# Patient Record
Sex: FEMALE | Race: WHITE | Employment: FULL TIME | URBAN - METROPOLITAN AREA
[De-identification: names, ages, dates, MRNs, and addresses within clinical notes are randomized per-mention and may not be internally consistent; named-entity substitution may affect disease eponyms.]

---

## 2017-02-28 ENCOUNTER — OFFICE VISIT (OUTPATIENT)
Dept: SURGERY | Facility: CLINIC | Age: 52
End: 2017-02-28

## 2017-02-28 VITALS — HEART RATE: 76 BPM | RESPIRATION RATE: 18 BRPM | DIASTOLIC BLOOD PRESSURE: 86 MMHG | SYSTOLIC BLOOD PRESSURE: 126 MMHG

## 2017-02-28 DIAGNOSIS — I67.1 ANEURYSM OF CAVERNOUS PORTION OF LEFT INTERNAL CAROTID ARTERY: Primary | ICD-10-CM

## 2017-02-28 PROCEDURE — 99214 OFFICE O/P EST MOD 30 MIN: CPT | Performed by: RADIOLOGY

## 2017-02-28 RX ORDER — METOPROLOL SUCCINATE 50 MG/1
1 TABLET, EXTENDED RELEASE ORAL DAILY
Refills: 6 | COMMUNITY
Start: 2017-01-09 | End: 2020-02-11

## 2017-02-28 RX ORDER — LOSARTAN POTASSIUM 100 MG/1
1 TABLET ORAL DAILY
Refills: 3 | COMMUNITY
Start: 2017-02-07 | End: 2020-02-11

## 2017-02-28 RX ORDER — ATORVASTATIN CALCIUM 10 MG/1
1 TABLET, FILM COATED ORAL DAILY
Refills: 6 | COMMUNITY
Start: 2017-02-17

## 2017-02-28 NOTE — PATIENT INSTRUCTIONS
Refill policies:    • Allow 2 business days for refills; controlled substances may take longer.   • Contact your pharmacy at least 5 days prior to running out of medication and have them send an electronic request or submit request through the “request re your physician has recommended that you have a procedure or additional testing performed. [facility]) will contact your insurance carrier to obtain pre-certification or prior authorization.     Unfortunately, MARTINE has seen an increas

## 2017-02-28 NOTE — PROGRESS NOTES
Has not completed f/u imaging she was unaware any imaging needed to be completed.     Review of Systems:    Hand Dominance: right  General: no symptoms reported  Neuro: no symptoms reported  Head: ear pain 2 weeks ago (happens once every few months and star

## 2017-02-28 NOTE — PROGRESS NOTES
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   As you well remember, Metoprolol Succinate ER 50 MG Oral Tablet 24 Hr Take 1 tablet by mouth daily. Disp:  Rfl: 6   aspirin 81 MG Oral Tab Take 81 mg by mouth daily. Disp:  Rfl:       Review of patient's allergies indicates no known allergies.      Family History   Problem Rel

## 2017-03-21 ENCOUNTER — HOSPITAL ENCOUNTER (OUTPATIENT)
Dept: MRI IMAGING | Facility: HOSPITAL | Age: 52
Discharge: HOME | End: 2017-03-21
Attending: RADIOLOGY
Payer: MEDICAID

## 2017-03-21 DIAGNOSIS — I67.1 ANEURYSM OF CAVERNOUS PORTION OF LEFT INTERNAL CAROTID ARTERY: ICD-10-CM

## 2017-03-21 PROCEDURE — 70546 MR ANGIOGRAPH HEAD W/O&W/DYE: CPT

## 2017-03-21 PROCEDURE — A9575 INJ GADOTERATE MEGLUMI 0.1ML: HCPCS | Performed by: RADIOLOGY

## 2017-04-21 ENCOUNTER — HOSPITAL ENCOUNTER (OUTPATIENT)
Dept: MAMMOGRAPHY | Facility: HOSPITAL | Age: 52
Discharge: HOME | End: 2017-04-21
Attending: INTERNAL MEDICINE
Payer: MEDICAID

## 2017-04-21 DIAGNOSIS — Z12.31 ENCOUNTER FOR SCREENING MAMMOGRAM FOR MALIGNANT NEOPLASM OF BREAST: ICD-10-CM

## 2017-04-21 PROCEDURE — 77067 SCR MAMMO BI INCL CAD: CPT

## 2017-05-04 ENCOUNTER — HOSPITAL ENCOUNTER (OUTPATIENT)
Dept: MAMMOGRAPHY | Facility: HOSPITAL | Age: 52
Discharge: HOME | End: 2017-05-04
Attending: INTERNAL MEDICINE
Payer: MEDICAID

## 2017-05-04 DIAGNOSIS — R92.2 INCONCLUSIVE MAMMOGRAM: ICD-10-CM

## 2017-05-04 PROCEDURE — 77065 DX MAMMO INCL CAD UNI: CPT | Performed by: INTERNAL MEDICINE

## 2017-05-04 PROCEDURE — 77061 BREAST TOMOSYNTHESIS UNI: CPT | Performed by: INTERNAL MEDICINE

## 2017-05-04 PROCEDURE — 76642 ULTRASOUND BREAST LIMITED: CPT | Performed by: INTERNAL MEDICINE

## 2018-04-24 ENCOUNTER — HOSPITAL ENCOUNTER (OUTPATIENT)
Dept: MAMMOGRAPHY | Facility: HOSPITAL | Age: 53
Discharge: HOME | End: 2018-04-24
Attending: INTERNAL MEDICINE
Payer: MEDICAID

## 2018-04-24 DIAGNOSIS — Z12.31 ENCOUNTER FOR SCREENING MAMMOGRAM FOR MALIGNANT NEOPLASM OF BREAST: ICD-10-CM

## 2018-04-24 PROCEDURE — 77067 SCR MAMMO BI INCL CAD: CPT | Performed by: INTERNAL MEDICINE

## 2020-03-14 PROBLEM — R31.0 GROSS HEMATURIA: Status: ACTIVE | Noted: 2020-03-14

## 2020-03-17 ENCOUNTER — HOSPITAL ENCOUNTER (EMERGENCY)
Facility: HOSPITAL | Age: 55
Discharge: HOME | End: 2020-03-17
Attending: EMERGENCY MEDICINE
Payer: COMMERCIAL

## 2020-03-17 VITALS
HEIGHT: 63 IN | HEART RATE: 69 BPM | WEIGHT: 145 LBS | SYSTOLIC BLOOD PRESSURE: 178 MMHG | TEMPERATURE: 98 F | BODY MASS INDEX: 25.69 KG/M2 | DIASTOLIC BLOOD PRESSURE: 96 MMHG | RESPIRATION RATE: 18 BRPM | OXYGEN SATURATION: 99 %

## 2020-03-17 DIAGNOSIS — R31.0 GROSS HEMATURIA: Primary | ICD-10-CM

## 2020-03-17 PROCEDURE — 99282 EMERGENCY DEPT VISIT SF MDM: CPT

## 2020-03-17 NOTE — ED INITIAL ASSESSMENT (HPI)
Saw urology 2 days ago for blood in urine and put on macrobid. She has had 4 doses of the macrobid and is not better yet.

## 2020-03-18 NOTE — ED PROVIDER NOTES
Patient Seen in: [facility] Emergency Department      History   Patient presents with:  Urinary Symptoms    Stated Complaint: blood w/ urination     HPI    49-year-old presents to the emergency department with blood in her urine no pain no burning b Head: Atraumatic. Right Ear: External ear normal.      Left Ear: External ear normal.      Nose: Nose normal.   Eyes:      General: No scleral icterus. Right eye: No discharge. Left eye: No discharge.       Conjunctiva/sclera: Conjunctiv they will be in touch.   Patient is agreeable to the plan she stable for discharge home    Disposition and Plan     Clinical Impression:  Gross hematuria  (primary encounter diagnosis)    Disposition:  Discharge  3/17/2020  7:51 pm    Follow-up:  Shea Fuchs

## 2020-03-18 NOTE — CM/SW NOTE
Pt has CT urogram scheduled from [facility] family medicine doctor. She has tried to schedule test to get it done here but they inform her that she is out of network.  I will call central scheduling tomorrow to see if I'm able to schedule her test. I did tell ozzy

## 2020-03-19 ENCOUNTER — TELEPHONE (OUTPATIENT)
Dept: SURGERY | Facility: CLINIC | Age: 55
End: 2020-03-19

## 2020-03-19 NOTE — TELEPHONE ENCOUNTER
Spoke to patients daughter and explained Dr. Ingrid Craig will call patient personally after she has obtained her imaging.  Daughter states patient is scheduled for imaging tomorrow as long as labs are WNL, and there are no issues at the imaging facility itself

## 2020-03-20 ENCOUNTER — TELEPHONE (OUTPATIENT)
Dept: SURGERY | Facility: CLINIC | Age: 55
End: 2020-03-20

## 2020-03-20 NOTE — TELEPHONE ENCOUNTER
Explained patient has had imaging prior, this time the ordering physican is Dr. Donna Metzger.   Attempt made to provide Dr. Heaven Adair information, facility hung up

## 2020-03-20 NOTE — TELEPHONE ENCOUNTER
patient needs to know if she has anything up in her head aparatus of some sort before putting her in machine for MRI

## 2021-04-22 ENCOUNTER — TELEPHONE (OUTPATIENT)
Dept: SURGERY | Facility: CLINIC | Age: 56
End: 2021-04-22

## 2021-04-22 NOTE — TELEPHONE ENCOUNTER
Please advise Dr. Hanna Argueta is out of office this week. We will need to touch base with him in regards to this patient next week.

## 2021-04-22 NOTE — TELEPHONE ENCOUNTER
Patient came in stating that she needs an MRI order to be put in for her. I see she has not been here in a while so I stated that we will get back to her on this order. She wanted to know if it is with contrast or without.  She cancelled the MRI last time in 2020

## 2021-04-28 ENCOUNTER — TELEPHONE (OUTPATIENT)
Dept: SURGERY | Facility: CLINIC | Age: 56
End: 2021-04-28

## 2021-04-28 DIAGNOSIS — I67.1 ANEURYSM OF CAVERNOUS PORTION OF LEFT INTERNAL CAROTID ARTERY: Primary | ICD-10-CM

## 2021-04-28 NOTE — TELEPHONE ENCOUNTER
Placing order for MRA Head w/ + w/out for pt. She had treatment of cavernous L ICA aneurysm in 2013 with pipeline by Dr. Dave Swan. She is overdue for follow-up imaging. She can follow up in clinic after she obtains her imaging.     Maki Lopez, 04/28/2

## 2021-04-30 NOTE — TELEPHONE ENCOUNTER
Received notification that imaging and follow up are due and that orders have been placed. [employer] message sent to patient to inform her of imaging orders. Reminder placed to check to see if imaging has been scheduled.

## 2021-04-30 NOTE — TELEPHONE ENCOUNTER
Placed reminder to check to see if MRA is scheduled. Additionally, in separate TE, patient has been sent [employer] message notifying her that imaging has been ordered and that she needs f/u appt.

## 2021-05-26 NOTE — TELEPHONE ENCOUNTER
Checked with Chely Brantley - case # [id number] -  case was still pending. Spoke with Leslie Ayala, and was able to get it approved, however, this was approved for [facility] as this facility is in network with her insurance.  I will update ref

## 2021-05-26 NOTE — TELEPHONE ENCOUNTER
Priority: Routine      Class: EHV - RFL      Associated DX: Aneurysm of cavernous portion of left internal carotid artery (I67.1)      Indications:                         [facility] () [10]                Indeterminate [13] No C

## 2021-05-26 NOTE — TELEPHONE ENCOUNTER
Reminder received in nursing tab that imaging is past due. Routed to prior auth team to address authorization.

## 2021-07-06 ENCOUNTER — TELEPHONE (OUTPATIENT)
Dept: NEUROLOGY | Facility: CLINIC | Age: 56
End: 2021-07-06

## 2021-08-17 ENCOUNTER — OFFICE VISIT (OUTPATIENT)
Dept: SURGERY | Facility: CLINIC | Age: 56
End: 2021-08-17
Payer: COMMERCIAL

## 2021-08-17 VITALS
BODY MASS INDEX: 24.16 KG/M2 | HEIGHT: 65 IN | WEIGHT: 145 LBS | DIASTOLIC BLOOD PRESSURE: 90 MMHG | SYSTOLIC BLOOD PRESSURE: 144 MMHG

## 2021-08-17 DIAGNOSIS — I67.1 ANEURYSM OF CAVERNOUS PORTION OF LEFT INTERNAL CAROTID ARTERY: Primary | ICD-10-CM

## 2021-08-17 PROCEDURE — 99203 OFFICE O/P NEW LOW 30 MIN: CPT | Performed by: RADIOLOGY

## 2021-08-17 PROCEDURE — 3077F SYST BP >= 140 MM HG: CPT | Performed by: RADIOLOGY

## 2021-08-17 PROCEDURE — 3080F DIAST BP >= 90 MM HG: CPT | Performed by: RADIOLOGY

## 2021-08-17 PROCEDURE — 3008F BODY MASS INDEX DOCD: CPT | Performed by: RADIOLOGY

## 2021-08-17 RX ORDER — OLMESARTAN MEDOXOMIL 40 MG/1
TABLET ORAL
COMMUNITY
Start: 2021-07-27

## 2021-08-17 RX ORDER — VENLAFAXINE HYDROCHLORIDE 37.5 MG/1
37.5 CAPSULE, EXTENDED RELEASE ORAL DAILY
COMMUNITY

## 2021-08-17 NOTE — PROGRESS NOTES
[facility]  Interventional Neuroradiology Clinic Note        Jane Vaca  Primary Care      Date of Service: 8/17/2021    Dear Connie Dyer,     We had the pleasure of seeing Rudolph Colorado in the Interventional Neuroradiology Clinic at [facility] • Breast Cancer Mother 70         Medications:    Current Outpatient Medications:   •  ergocalciferol 1.25 MG (11213 UT) Oral Cap, Take 1 capsule (50,000 Units total) by mouth every 7 days. , Disp: 12 capsule, Rfl: 0  •  Metoprolol Succinate ER 50 MG Oral the right internal carotid artery. 3/21/2017 MRA head W/WO  CONCLUSION:     1. Artifact from pipeline stent is noted spanning the cavernous and periophthalmic segments of the left internal carotid artery.  There is no contrast-related or flow related enh

## 2021-08-17 NOTE — PROGRESS NOTES
Patient is here for follow up. She was last seen in 2017 for aneurysm and is here to review her recent imaging (MRA brain and MRA head on 7-6-21). In 2013 she had a flow diverting stent placed.       Review of Systems:    Hand Dominance: right  General: n

## 2021-09-03 ENCOUNTER — TELEPHONE (OUTPATIENT)
Dept: SURGERY | Facility: CLINIC | Age: 56
End: 2021-09-03

## 2021-09-03 NOTE — TELEPHONE ENCOUNTER
Per OV note on 9-2-21 Noncontrast MRA head in 3 years (July 2024), FU in clinic to discuss     Reminder placed

## 2023-05-16 NOTE — OPERATIVE REPORT
PATIENT NAME: Benjie Garvin  MRN: [id number]  DATE OF OPERATION:  5/18/23  REFERRING PHYSICIAN: Dr. Mike Awad  Medications:  MAC  Date of last COLONOSCOPY:  2013  PREOPERATIVE DIAGNOSIS                colon cancer screening   POSTOPERATIVE DIAGNOSIS:  1. 1 cm sigmoid colon polyps x 2 removed with a hot snare, from 35 cm in the sigmoid. 2. 6 mm sigmoid colon polyps x 2 were removed via cold snare. 3. Diverticulosis were scattered throughout the colon. 4. Enlarged internal hemorrhoids. PROCEDURE PERFORMED:               Colonoscopy with polypectomy via hot snare and cold snare. Endoscopist:                                             Ramona Roger MD     PROCEDURE AND FINDINGS: The patient was placed into the left lateral decubitus after informed consent was obtained. All questions were answered. An updated history and physical were performed and an ASA score of 2 was assigned. Informed consent was obtained prior to the procedure, with review of possible complications including bleeding, infection, and missed polyps and or cancer. MAC sedation was administered. A digital rectal exam was performed and was normal.  The video pediatric colonoscope was passed from anus to cecum. The ileocecal valve, appendiceal orfice, hepatic and splenic flexures were all well visualized. The bowel preparation was rated on the Aronchick bowel prep scale as 1. (1 - excellent > 95% mucosa seen; 2 - good - clear liquid up to 25% of the mucosa, 90% mucosa seen;  3 - fair - semisolid stool not suctioned, but 90% of the mucosa seen; 4 - poor - semisolid stool not suctioned, but < 90% mucosa seen; 5 - inadequate - repeat prep needed) . Findings included 1 cm sigmoid colon polyps x 2 removed with a hot snare, from 35 cm in the sigmoid. 6 mm sigmoid colon polyps x 2 were removed via cold snare. Diverticulosis were scattered throughout the colon. On retroflexion of the scope in the anorectum, normal internal hemorrhoids were noted. The scope withdrawal from cecum to anus was 15 minutes. RECOMMENDATIONS         1. The patient will be provided with a written summary of today's endoscopic findings. 2. The patient will be notified with biopsy results in 2 - 4 working days. 3. Do not take aspirin or any other aspirin-like medication for 10 days after the procedure. 4. Recommendations regarding repeat colonoscopy will be made once the biopsy results are reviewed.       Enedina Lock MD, Gastroenterologist  Cc: Dr. Rita Leiva

## 2023-05-18 ENCOUNTER — ANESTHESIA (OUTPATIENT)
Dept: ENDOSCOPY | Facility: HOSPITAL | Age: 58
End: 2023-05-18
Payer: COMMERCIAL

## 2023-05-18 ENCOUNTER — ANESTHESIA EVENT (OUTPATIENT)
Dept: ENDOSCOPY | Facility: HOSPITAL | Age: 58
End: 2023-05-18
Payer: COMMERCIAL

## 2023-05-18 ENCOUNTER — HOSPITAL ENCOUNTER (OUTPATIENT)
Facility: HOSPITAL | Age: 58
Setting detail: HOSPITAL OUTPATIENT SURGERY
Discharge: HOME | End: 2023-05-18
Attending: INTERNAL MEDICINE | Admitting: INTERNAL MEDICINE
Payer: COMMERCIAL

## 2023-05-18 VITALS
HEART RATE: 60 BPM | BODY MASS INDEX: 27.46 KG/M2 | TEMPERATURE: 99 F | DIASTOLIC BLOOD PRESSURE: 98 MMHG | HEIGHT: 63 IN | RESPIRATION RATE: 18 BRPM | SYSTOLIC BLOOD PRESSURE: 151 MMHG | OXYGEN SATURATION: 97 % | WEIGHT: 155 LBS

## 2023-05-18 PROCEDURE — 88305 TISSUE EXAM BY PATHOLOGIST: CPT | Performed by: INTERNAL MEDICINE

## 2023-05-18 PROCEDURE — 0DBN8ZX EXCISION OF SIGMOID COLON, VIA NATURAL OR ARTIFICIAL OPENING ENDOSCOPIC, DIAGNOSTIC: ICD-10-PCS | Performed by: INTERNAL MEDICINE

## 2023-05-18 RX ORDER — NALOXONE HYDROCHLORIDE 0.4 MG/ML
80 INJECTION, SOLUTION INTRAMUSCULAR; INTRAVENOUS; SUBCUTANEOUS AS NEEDED
Status: DISCONTINUED | OUTPATIENT
Start: 2023-05-18 | End: 2023-05-18

## 2023-05-18 RX ORDER — SODIUM CHLORIDE, SODIUM LACTATE, POTASSIUM CHLORIDE, CALCIUM CHLORIDE 600; 310; 30; 20 MG/100ML; MG/100ML; MG/100ML; MG/100ML
INJECTION, SOLUTION INTRAVENOUS CONTINUOUS
Status: DISCONTINUED | OUTPATIENT
Start: 2023-05-18 | End: 2023-05-18

## 2023-05-18 RX ORDER — LIDOCAINE HYDROCHLORIDE 10 MG/ML
INJECTION, SOLUTION EPIDURAL; INFILTRATION; INTRACAUDAL; PERINEURAL AS NEEDED
Status: DISCONTINUED | OUTPATIENT
Start: 2023-05-18 | End: 2023-05-18 | Stop reason: SURG

## 2023-05-18 RX ORDER — ONDANSETRON 2 MG/ML
4 INJECTION INTRAMUSCULAR; INTRAVENOUS AS NEEDED
Status: DISCONTINUED | OUTPATIENT
Start: 2023-05-18 | End: 2023-05-18

## 2023-05-18 RX ADMIN — LIDOCAINE HYDROCHLORIDE 25 MG: 10 INJECTION, SOLUTION EPIDURAL; INFILTRATION; INTRACAUDAL; PERINEURAL at 09:40:00

## 2023-05-18 RX ADMIN — SODIUM CHLORIDE, SODIUM LACTATE, POTASSIUM CHLORIDE, CALCIUM CHLORIDE: 600; 310; 30; 20 INJECTION, SOLUTION INTRAVENOUS at 09:40:00

## 2023-05-18 NOTE — DISCHARGE INSTRUCTIONS
ENDOSCOPY DISCHARGE INSTRUCTIONS    Procedure Performed:   Colonoscopy  Endoscopist: No name on file  FINDINGS:   Internal/external hemorrhoids, Diverticulosis (pockets in colon that develop with age and lack of fiber intake), and Colon polyp(s) (a growth in the colon)    MEDICATIONS:  You may resume all other medications today    DIET:  General    BIOPSIES:  Biopsies were taken (you will be notified of results in 7-10 days)      ADDITIONAL RECOMMENDATIONS:  Do not take aspirin or any other aspirin-like medication for 10 days after the procedure. Recommendations regarding repeat colonoscopy will be made once the biopsy results are reviewed. Activity for remainder of today:    REST TODAY  DO NOT drive or operate heavy machinery  DO NOT drink any alcoholic beverages  DO NOT sign any legal documents or make any important decisions    After your procedure(s): It is not unusual to feel bloated or gassy . Passing gas and belching is encouraged. Lying on your left side with your knees flexed may relieve the discomfort. A hot pack to the abdomen may also help. After your gastroscopy (upper endoscopy): You may experience a slight sore throat which will subside. Throat lozenges or salt water gargle can be used.     FOLLOW-UP:  Contact the office at [phone] for follow-up appointment if needed or if you develop any of the following:    Severe abdominal pain/discomfort       Excessive bleeding or black tarry stool  Difficulty breathing or swallowing        Persistent nausea,vomiting, or a fever above 100 degrees or chills

## 2023-05-18 NOTE — ANESTHESIA POSTPROCEDURE EVALUATION
[facility] Patient Status:  Hospital Outpatient Surgery   Age/Gender 59year old female MRN [id number]   Location [address] [address] Attending Josefina Banks MD   Hosp Day # 0 PCP Olimpia Bernard DO       Anesthesia Post-op Note    COLONOSCOPY with hot snare and cold snare polypectomy     Procedure Summary     Date: 05/18/23 Room / Location: [facility] ENDOSCOPY 03 / [facility] ENDOSCOPY    Anesthesia Start: 1346 Anesthesia Stop: 7026    Procedure: COLONOSCOPY with hot snare and cold snare polypectomy Diagnosis: (hemorrhoids, colon polyps, diverticulosis)    Surgeons: Josefina Banks MD Anesthesiologist: Cassandra Winn MD    Anesthesia Type: MAC ASA Status: 2          Anesthesia Type: MAC    Vitals Value Taken Time   /80 05/18/23 1007   Temp 98.7 05/18/23 1008   Pulse 59 05/18/23 1008   Resp 16 05/18/23 1008   SpO2 99 % 05/18/23 1008   Vitals shown include unvalidated device data. Patient Location: Endoscopy    Anesthesia Type: MAC    Airway Patency: patent    Postop Pain Control: adequate    Mental Status: preanesthetic baseline    Nausea/Vomiting: none    Cardiopulmonary/Hydration status: stable euvolemic    Complications: no apparent anesthesia related complications    Postop vital signs: stable    Dental Exam: Unchanged from Preop    Patient to be discharged home when criteria met.

## 2023-05-18 NOTE — BRIEF OP NOTE
Pre-Operative Diagnosis: SPECIAL SCREENING FOR MALIGNANT NEOPLASMS COLON     Post-Operative Diagnosis: hemorrhoids, colon polyps, diverticulosis      Procedure Performed:   COLONOSCOPY with hot snare and cold snare polypectomy     Surgeon(s) and Role:     * Barrera Bethea MD - Primary    Assistant(s):        Surgical Findings: see above     Specimen: see op note     Estimated Blood Loss: No data recorded    Dictation Number:  none    Fifi Corona MD  5/18/2023  10:09 AM

## 2023-07-09 ENCOUNTER — APPOINTMENT (OUTPATIENT)
Dept: ULTRASOUND IMAGING | Facility: HOSPITAL | Age: 58
End: 2023-07-09
Payer: COMMERCIAL

## 2023-07-09 ENCOUNTER — HOSPITAL ENCOUNTER (EMERGENCY)
Facility: HOSPITAL | Age: 58
Discharge: HOME | End: 2023-07-09
Attending: EMERGENCY MEDICINE
Payer: COMMERCIAL

## 2023-07-09 VITALS
WEIGHT: 146 LBS | HEART RATE: 64 BPM | DIASTOLIC BLOOD PRESSURE: 95 MMHG | OXYGEN SATURATION: 95 % | RESPIRATION RATE: 17 BRPM | BODY MASS INDEX: 24.92 KG/M2 | TEMPERATURE: 98 F | SYSTOLIC BLOOD PRESSURE: 156 MMHG | HEIGHT: 64 IN

## 2023-07-09 DIAGNOSIS — T14.8XXA MUSCLE STRAIN: Primary | ICD-10-CM

## 2023-07-09 PROCEDURE — 99284 EMERGENCY DEPT VISIT MOD MDM: CPT

## 2023-07-09 PROCEDURE — 93971 EXTREMITY STUDY: CPT

## 2023-07-09 NOTE — DISCHARGE INSTRUCTIONS
Tylenol or Advil as needed follow-up with your doctor of next 2 days apply ice 20 minutes 4 times a day return if worse

## 2024-10-10 ENCOUNTER — TELEPHONE (OUTPATIENT)
Dept: SURGERY | Facility: CLINIC | Age: 59
End: 2024-10-10

## 2024-10-10 DIAGNOSIS — I67.1 CEREBRAL ANEURYSM (HCC): Primary | ICD-10-CM

## 2024-10-10 NOTE — TELEPHONE ENCOUNTER
Received request for updated imaging:  Noncontrast MRA to monitor Aneurysm of cavernous portion of left internal carotid artery for 3 year serial imaging.     Last office visit was on 8/17/21.  2013:  patient had a flow diverting stent placed.     Routed to SARAH BETH Juarez.

## 2024-10-11 NOTE — TELEPHONE ENCOUNTER
Called and spoke to patient notified her of order. She is going to have done at an alternate facility due to insurance. She will call back with a phone/fax number so we can fax the order.

## 2024-10-11 NOTE — TELEPHONE ENCOUNTER
Order has been placed for noncontrast MRA brain.     Please advise patient to obtain and to schedule a follow up apt with Dr. Madison or myself to discuss results.     Thank you!

## 2024-11-30 ENCOUNTER — HOSPITAL ENCOUNTER (OUTPATIENT)
Dept: MRI IMAGING | Facility: HOSPITAL | Age: 59
Discharge: HOME | End: 2024-11-30
Payer: COMMERCIAL

## 2024-11-30 DIAGNOSIS — I67.1 CEREBRAL ANEURYSM (HCC): ICD-10-CM

## 2024-11-30 PROCEDURE — 70544 MR ANGIOGRAPHY HEAD W/O DYE: CPT

## 2024-12-02 ENCOUNTER — TELEPHONE (OUTPATIENT)
Dept: SURGERY | Facility: CLINIC | Age: 59
End: 2024-12-02

## 2024-12-02 NOTE — TELEPHONE ENCOUNTER
Please call patient and arrange follow up visit with Dr. Madison to discuss most recent imaging results. This can be a phone/video visit, if pt prefers.     Thank you!

## 2025-01-02 ENCOUNTER — OFFICE VISIT (OUTPATIENT)
Dept: SURGERY | Facility: CLINIC | Age: 60
End: 2025-01-02
Payer: COMMERCIAL

## 2025-01-02 VITALS
DIASTOLIC BLOOD PRESSURE: 70 MMHG | BODY MASS INDEX: 25.61 KG/M2 | HEIGHT: 64 IN | WEIGHT: 150 LBS | OXYGEN SATURATION: 96 % | SYSTOLIC BLOOD PRESSURE: 118 MMHG | HEART RATE: 82 BPM

## 2025-01-02 DIAGNOSIS — I67.1 CEREBRAL ANEURYSM (HCC): Primary | ICD-10-CM

## 2025-01-02 PROCEDURE — 99214 OFFICE O/P EST MOD 30 MIN: CPT | Performed by: RADIOLOGY

## 2025-01-02 PROCEDURE — 3078F DIAST BP <80 MM HG: CPT | Performed by: RADIOLOGY

## 2025-01-02 PROCEDURE — 3008F BODY MASS INDEX DOCD: CPT | Performed by: RADIOLOGY

## 2025-01-02 PROCEDURE — 3074F SYST BP LT 130 MM HG: CPT | Performed by: RADIOLOGY

## 2025-01-02 RX ORDER — OLMESARTAN MEDOXOMIL AND HYDROCHLOROTHIAZIDE 40/12.5 40; 12.5 MG/1; MG/1
1 TABLET ORAL DAILY
COMMUNITY

## 2025-01-02 NOTE — PROGRESS NOTES
[facility]  Interventional Neuroradiology Clinic Note      Momo Azevedo DO  Primary Care      Date of Service: 1/2/2025    Dear Colleagues,     We had the pleasure of seeing Lupe Villaseñor in the Interventional Neuroradiology Clinic at [facility] for her 11 yr follow up s/p flow diversion treatment of an enlarging, symptomatic cavernous left internal carotid artery aneurysm.     Review of Systems:     Hand Dominance: right  General: no symptoms reported  Neuro: no symptoms reported  Head: ringing in ears  Musculoskeletal: no symptoms reported  Cardiovascular: no symptoms reported  Gastrointestinal: no symptoms reported  Genitourinary: no symptoms reported  Respiratory: no symptoms reported  Eyes: no symptoms reported  Skin: no symptoms reported  Mouth & throat: no symptoms reported  Neck: no symptoms reported  Nose: no symptoms reported  Psychiatric: no symptoms reported     Patient states she stopped smoking 3 months ago.      Past Medical/Surgical History:  Past Medical History:    Aneurysm (HCC)    cerebral    High blood pressure    High cholesterol    Hyperlipidemia    Hypertension    Vitamin D deficiency       Past Surgical History:   Procedure Laterality Date    Colonoscopy  2014    hemorrhoids, repeat in 10 years    Colonoscopy N/A 5/18/2023    Procedure: COLONOSCOPY with hot snare and cold snare polypectomy;  Surgeon: Kirby Bolanos MD;  Location:  ENDOSCOPY    Other  2013    aneurysm coiling    Ulcer care         Social History:    reports that she has quit smoking. Her smoking use included cigarettes. She started smoking about 4 months ago. She has never used smokeless tobacco. She reports that she does not currently use alcohol. She reports that she does not use drugs.    Family History:  Family History   Problem Relation Age of Onset    Breast Cancer Mother 65       Medications:    Current Outpatient Medications:     Metoprolol Succinate ER 50 MG Oral Tablet 24 Hr, Take 1  tablet (50 mg total) by mouth daily., Disp: 180 tablet, Rfl: 3    aspirin 81 MG Oral Tab, Take 1 tablet (81 mg total) by mouth daily., Disp: , Rfl:     Olmesartan Medoxomil-HCTZ 40-12.5 MG Oral Tab, Take 1 tablet by mouth daily., Disp: , Rfl:     Olmesartan Medoxomil 40 MG Oral Tab, , Disp: , Rfl:     Allergies:   Allergies[1]    Physical Exam:  /70 (BP Location: Right arm, Patient Position: Sitting, Cuff Size: adult)   Pulse 82   Ht 64\"   Wt 150 lb (68 kg)   SpO2 96%   BMI 25.75 kg/m²   GENERAL:  The patient was pleasant and cooperative throughout the examination.    HEENT/NEURO:  The patient's head is normocephalic with anicteric sclerae, moist mucous membranes, and nasal cavities. The patient is alert, awake, and oriented with normal intelligence, memory, language, and judgement.  On cranial nerve examination, full visual fields are noted by confrontational testing.  The patient's pupils are equally responsive and reactive to light.  Extraocular muscles are intact.  There is no evidence of afferent pupillary defect, anisocoria, myosis, or ptosis.  Facial expression and sensation are symmetric and intact bilaterally in the V1-V3 distributions.  The patient's hearing is grossly intact.  The palate elevates symmetrically and the voice is normal.  Sternocleidomastoid and trapezius muscle strength are intact.  The tongue demonstrates protrusion in the midline.  The patient's strength and sensation are intact in bilateral upper and lower extremities. There is no pronator drift on [place] testing.  Romberg testing is negative.  There is no evidence of nystagmus, ataxia, dysarthria, or dysmetria with finger-to-nose testing.            Imaging: all reviewed by Dr. Madison     11/30/2024 MRA brain   CONCLUSION:       1. Changes related to previous pipeline stent placement with no evidence to suggest persistent flow in residual aneurysm sac as above.      2. Stable 4 mm aneurysm along the cavernous segment of the  right internal carotid artery.      Please see above for further details.     7/6/2021 MRA brain    IMPRESSION: 4 mm aneurysm extending off the cavernous portion of the right internal carotid artery.     3/21/2017 MRA head W/WO  CONCLUSION:     1. Artifact from pipeline stent is noted spanning the cavernous and periophthalmic segments of the left internal carotid artery. There is no contrast-related or flow related enhancement within the residual aneurysm sac suggesting thrombosis. The residual    sac measures up to 1.4 cm in the axial plane on the current exam as compared to 1.6 cm on the previous exam.   2. There is a stable 3 mm aneurysm projecting anteriorly and laterally from the proximal cavernous segment of the right internal carotid artery.            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 Recent MRA shows stability.       Plan:    - Obtain repeat noncontrast MRA brain next in 5 years (Nov/Dec 2029)       LOS Ybarra      Imaging reviewed and case discussed with Dr. Nery Madison     1/2/2025 1:47 PM      I spent approximately 30 minutes reviewing the patient's chart and imaging, with at least half the time spent on counseling the patient on her pathology and conservative medical management/treatment plan.                 [1] No Known Allergies

## 2025-01-02 NOTE — PROGRESS NOTES
Patient here for follow up after imaging. Patient has a history of Left ICA carotid cave aneurysm and HTN, smoking-recently quit.   Patient takes olmesartan, metoprolol, and 81 mg ASA.     Review of Systems:    Hand Dominance: right  General: no symptoms reported  Neuro: no symptoms reported  Head: ringing in ears  Musculoskeletal: no symptoms reported  Cardiovascular: no symptoms reported  Gastrointestinal: no symptoms reported  Genitourinary: no symptoms reported  Respiratory: no symptoms reported  Eyes: no symptoms reported  Skin: no symptoms reported  Mouth & throat: no symptoms reported  Neck: no symptoms reported  Nose: no symptoms reported  Psychiatric: no symptoms reported    Patient states she stopped smoking 3 months ago. Imaging completed:  MRA on 11.30.24.  Last office visit was on 8.17.21 with Dr. Madison.

## 2025-01-02 NOTE — PATIENT INSTRUCTIONS
Refill policies:    Allow 2-3 business days for refills; controlled substances may take longer.  Contact your pharmacy at least 5 days prior to running out of medication and have them send an electronic request or submit request through the “request refill” option in your [employer] account.  Refills are not addressed on weekends; covering physicians do not authorize routine medications on weekends.  No narcotics or controlled substances are refilled after noon on Fridays or by on call physicians.  By law, narcotics must be electronically prescribed.  A 30 day supply with no refills is the maximum allowed.  If your prescription is due for a refill, you may be due for a follow up appointment.  To best provide you care, patients receiving routine medications need to be seen at least once a year.  Patients receiving narcotic/controlled substance medications need to be seen at least once every 3 months.  In the event that your preferred pharmacy does not have the requested medication in stock (e.g. Backordered), it is your responsibility to find another pharmacy that has the requested medication available.  We will gladly send a new prescription to that pharmacy at your request.    Scheduling Tests:    If your physician has ordered radiology tests such as MRI or CT scans, please contact Central Scheduling at [phone] right away to schedule the test.  Once scheduled, the [facility] Centralized Referral Team will work with your insurance carrier to obtain pre-certification or prior authorization.  Depending on your insurance carrier, approval may take 3-10 days.  It is highly recommended patients assure they have received an authorization before having a test performed.  If test is done without insurance authorization, patient may be responsible for the entire amount billed.      Precertification and Prior Authorizations:  If your physician has recommended that you have a procedure or additional testing performed the [facility]  Centralized Referral Team will contact your insurance carrier to obtain pre-certification or prior authorization.    You are strongly encouraged to contact your insurance carrier to verify that your procedure/test has been approved and is a COVERED benefit.  Although the [facility] Centralized Referral Team does its due diligence, the insurance carrier gives the disclaimer that \"Although the procedure is authorized, this does not guarantee payment.\"    Ultimately the patient is responsible for payment.   Thank you for your understanding in this matter.  Paperwork Completion:  If you require FMLA or disability paperwork for your recovery, please make sure to either drop it off or have it faxed to our office at [phone]. Be sure the form has your name and date of birth on it.  The form will be faxed to our Forms Department and they will complete it for you.  There is a 25$ fee for all forms that need to be filled out.  Please be aware there is a 10-14 day turnaround time.  You will need to sign a release of information (JASON) form if your paperwork does not come with one.  You may call the Forms Department with any questions at [phone].  Their fax number is [phone].

## 2025-01-28 ENCOUNTER — TELEPHONE (OUTPATIENT)
Dept: SURGERY | Facility: CLINIC | Age: 60
End: 2025-01-28

## 2025-01-28 NOTE — TELEPHONE ENCOUNTER
Please place pt reminder for noncontrast MRA brain next in 5 years (Nov/Dec 2029).     Thank you!

## 2025-01-29 ENCOUNTER — V-VISIT (OUTPATIENT)
Dept: URGENT CARE | Age: 60
End: 2025-01-29

## 2025-01-29 VITALS
WEIGHT: 148 LBS | HEART RATE: 84 BPM | RESPIRATION RATE: 18 BRPM | DIASTOLIC BLOOD PRESSURE: 72 MMHG | SYSTOLIC BLOOD PRESSURE: 110 MMHG | OXYGEN SATURATION: 97 % | TEMPERATURE: 100.5 F | HEIGHT: 64 IN | BODY MASS INDEX: 25.27 KG/M2

## 2025-01-29 DIAGNOSIS — J06.9 ACUTE URI: Primary | ICD-10-CM

## 2025-01-29 LAB
FLUAV AG UPPER RESP QL IA.RAPID: NEGATIVE
FLUBV AG UPPER RESP QL IA.RAPID: NEGATIVE
INTERNAL PROCEDURAL CONTROLS ACCEPTABLE: YES
S PYO AG THROAT QL IA.RAPID: NEGATIVE
SARS-COV+SARS-COV-2 AG RESP QL IA.RAPID: NOT DETECTED
TEST LOT EXPIRATION DATE: NORMAL
TEST LOT EXPIRATION DATE: NORMAL
TEST LOT NUMBER: NORMAL
TEST LOT NUMBER: NORMAL

## 2025-01-29 RX ORDER — VENLAFAXINE HYDROCHLORIDE 37.5 MG/1
37.5 CAPSULE, EXTENDED RELEASE ORAL DAILY
COMMUNITY

## 2025-01-29 RX ORDER — OLMESARTAN MEDOXOMIL AND HYDROCHLOROTHIAZIDE 40/12.5 40; 12.5 MG/1; MG/1
1 TABLET ORAL DAILY
COMMUNITY

## 2025-01-29 RX ORDER — ROSUVASTATIN CALCIUM 5 MG/1
5 TABLET, COATED ORAL NIGHTLY
COMMUNITY

## 2025-01-29 RX ORDER — ASPIRIN 81 MG/1
81 TABLET ORAL DAILY
COMMUNITY

## 2025-01-29 RX ORDER — VARENICLINE TARTRATE 0.5 (11)-1
KIT ORAL
COMMUNITY
Start: 2024-10-09

## 2025-01-29 RX ORDER — METOPROLOL SUCCINATE 50 MG/1
TABLET, EXTENDED RELEASE ORAL
COMMUNITY
Start: 2024-10-28

## 2025-01-29 RX ORDER — VARENICLINE TARTRATE 1 MG/1
1 TABLET, FILM COATED ORAL
COMMUNITY
Start: 2024-11-11

## 2025-01-29 ASSESSMENT — ENCOUNTER SYMPTOMS
VOICE CHANGE: 1
RHINORRHEA: 0
SHORTNESS OF BREATH: 0
NAUSEA: 0
CHEST TIGHTNESS: 0
CHILLS: 1
COUGH: 1
WHEEZING: 0
FATIGUE: 0
ALLERGIC/IMMUNOLOGIC NEGATIVE: 1
ABDOMINAL PAIN: 0
GASTROINTESTINAL NEGATIVE: 1
HEADACHES: 0
VOMITING: 0

## 2025-01-31 ENCOUNTER — TELEPHONE (OUTPATIENT)
Dept: FAMILY MEDICINE | Age: 60
End: 2025-01-31

## 2025-01-31 LAB — S PYO SPEC QL CULT: NORMAL

## (undated) DEVICE — 3M™ RED DOT™ MONITORING ELECTRODE WITH FOAM TAPE AND STICKY GEL, 50/BAG, 20/CASE, 72/PLT 2570: Brand: RED DOT™

## (undated) DEVICE — 1200CC GUARDIAN II: Brand: GUARDIAN

## (undated) DEVICE — KIT ENDO ORCAPOD 160/180/190

## (undated) DEVICE — BIOGUARD CLEANING ADAPTER

## (undated) DEVICE — 10FT COMBINED O2 DELIVERY/CO2 MONITORING. FILTER WITH MICROSTREAM TYPE LUER: Brand: DUAL ADULT NASAL CANNULA

## (undated) DEVICE — SNARE CAPTIFLEX MICRO-OVL OLY

## (undated) DEVICE — TRAP SPEC REMOVAL ETRAP 15CM

## (undated) DEVICE — REM POLYHESIVE ADULT PATIENT RETURN ELECTRODE: Brand: VALLEYLAB

## (undated) DEVICE — ENDOSCOPY PACK - LOWER: Brand: MEDLINE INDUSTRIES, INC.

## (undated) NOTE — MR AVS SNAPSHOT
[address], [address] [place] [address] [phone]               Thank you for choosing us for your health care visit with Chris Zamora MD.  We are glad to serve you and happy to provide you with this sum ? Refills are not addressed on weekends; covering physicians do not authorize routine medications on weekends. ? No narcotics or controlled substances are refilled after noon on Fridays or by on call physicians.   ? By law, narcotics cannot be faxed or gabino the procedure/test has been pre-certified. You are strongly encouraged to contact your insurance carrier to verify that your procedure/test has been approved and is a COVERED benefit.   Although the [facility] staff does its due diligence, the insurance carrier g

## (undated) NOTE — Clinical Note
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   As you well remember Metoprolol Succinate ER 50 MG Oral Tablet 24 Hr Take 1 tablet by mouth daily. Disp:  Rfl: 6   aspirin 81 MG Oral Tab Take 81 mg by mouth daily. Disp:  Rfl:       Review of patient's allergies indicates no known allergies.      Family History   Problem Relat Thanks for involving me in the care of this pleasant patient. Mignon Hurst M.D.   Neurointerventional Surgery  Professor, Departments of Radiology & Neurosurgery  [facility]

## (undated) NOTE — ED AVS SNAPSHOT
Thomas Isabel   MRN: [id number]    Department:  [facility] Emergency Department   Date of Visit:  3/17/2020           Disclosure     Insurance plans vary and the physician(s) referred by the ER may not be covered by your plan.  Please contact your i tell this physician (or your personal doctor if your instructions are to return to your personal doctor) about any new or lasting problems. The primary care or specialist physician will see patients referred from the [facility] Emergency Department.  Emeka Archuleta